# Patient Record
Sex: MALE | Race: BLACK OR AFRICAN AMERICAN | Employment: UNEMPLOYED | URBAN - METROPOLITAN AREA
[De-identification: names, ages, dates, MRNs, and addresses within clinical notes are randomized per-mention and may not be internally consistent; named-entity substitution may affect disease eponyms.]

---

## 2021-01-24 ENCOUNTER — HOSPITAL ENCOUNTER (EMERGENCY)
Age: 36
Discharge: HOME | End: 2021-01-24
Attending: EMERGENCY MEDICINE
Payer: COMMERCIAL

## 2021-01-24 VITALS
WEIGHT: 230 LBS | HEART RATE: 90 BPM | TEMPERATURE: 98.1 F | SYSTOLIC BLOOD PRESSURE: 117 MMHG | BODY MASS INDEX: 32.93 KG/M2 | HEIGHT: 70 IN | RESPIRATION RATE: 17 BRPM | OXYGEN SATURATION: 98 % | DIASTOLIC BLOOD PRESSURE: 79 MMHG

## 2021-01-24 DIAGNOSIS — M25.462 KNEE EFFUSION, LEFT: Primary | ICD-10-CM

## 2021-01-24 PROCEDURE — 99282 EMERGENCY DEPT VISIT SF MDM: CPT

## 2021-01-24 RX ORDER — IBUPROFEN 600 MG/1
600 TABLET ORAL
Qty: 30 TAB | Refills: 0 | Status: SHIPPED | OUTPATIENT
Start: 2021-01-24 | End: 2021-04-26

## 2021-01-24 NOTE — ED TRIAGE NOTES
Pt. Complains of knee swelling for the past day, pt denies any trauma or injury, pt states pain is about 2-3 out of 10. PMS and ROM intact.

## 2021-01-24 NOTE — ED PROVIDER NOTES
HPI patient is a 42-year-old male who presents to the ER with complaint having pain in his left knee. Patient state he injuried  his left knee in the past.  He states every once in a while he have left knee pain with edema. No recent injury. History reviewed. No pertinent past medical history. History reviewed. No pertinent surgical history. History reviewed. No pertinent family history. Social History     Socioeconomic History    Marital status: SINGLE     Spouse name: Not on file    Number of children: Not on file    Years of education: Not on file    Highest education level: Not on file   Occupational History    Not on file   Social Needs    Financial resource strain: Not on file    Food insecurity     Worry: Not on file     Inability: Not on file    Transportation needs     Medical: Not on file     Non-medical: Not on file   Tobacco Use    Smoking status: Current Some Day Smoker    Smokeless tobacco: Never Used   Substance and Sexual Activity    Alcohol use: Yes     Comment: occasionally    Drug use: Never    Sexual activity: Not on file   Lifestyle    Physical activity     Days per week: Not on file     Minutes per session: Not on file    Stress: Not on file   Relationships    Social connections     Talks on phone: Not on file     Gets together: Not on file     Attends Yazidism service: Not on file     Active member of club or organization: Not on file     Attends meetings of clubs or organizations: Not on file     Relationship status: Not on file    Intimate partner violence     Fear of current or ex partner: Not on file     Emotionally abused: Not on file     Physically abused: Not on file     Forced sexual activity: Not on file   Other Topics Concern    Not on file   Social History Narrative    Not on file         ALLERGIES: Keflex [cephalexin]    Review of Systems   Constitutional: Negative. HENT: Negative. Eyes: Negative. Respiratory: Negative. Cardiovascular: Negative. Gastrointestinal: Negative. Endocrine: Negative. Genitourinary: Negative. Musculoskeletal: Negative. LEFT KNEE: (+) mild left knee pain     Skin: Negative. Allergic/Immunologic: Negative. Neurological: Negative. Hematological: Negative. Psychiatric/Behavioral: Negative. All other systems reviewed and are negative. Vitals:    01/24/21 0217   BP: 117/79   Pulse: 90   Resp: 17   Temp: 98.1 °F (36.7 °C)   SpO2: 98%   Weight: 104.3 kg (230 lb)   Height: 5' 10\" (1.778 m)            Physical Exam  Vitals signs and nursing note reviewed. Constitutional:       General: He is not in acute distress. Appearance: He is well-developed. HENT:      Head: Normocephalic. Eyes:      Conjunctiva/sclera: Conjunctivae normal.      Pupils: Pupils are equal, round, and reactive to light. Neck:      Musculoskeletal: Normal range of motion and neck supple. Cardiovascular:      Rate and Rhythm: Normal rate and regular rhythm. Heart sounds: Normal heart sounds. No murmur. Pulmonary:      Effort: Pulmonary effort is normal. No respiratory distress. Breath sounds: Normal breath sounds. No wheezing or rales. Chest:      Chest wall: No tenderness. Abdominal:      General: Bowel sounds are normal. There is no distension. Palpations: Abdomen is soft. Tenderness: There is no abdominal tenderness. There is no rebound. Musculoskeletal: Normal range of motion. General: Swelling present. No tenderness. Comments: LEFT KNEE: (+) minimal edema, no pain with palpation, no palpable effustion, normal ROM, pulses and sensory. Skin:     General: Skin is warm and dry. Findings: No rash. Neurological:      Mental Status: He is alert and oriented to person, place, and time. Cranial Nerves: No cranial nerve deficit. Motor: No abnormal muscle tone.       Coordination: Coordination normal.   Psychiatric:         Behavior: Behavior normal.         Thought Content: Thought content normal.         Judgment: Judgment normal.          MDM       Procedures    Dx: effusion of left knee    Disp: D/C  Home, Rx: motrin    Dictation disclaimer:  Please note that this dictation was completed with [employer], the computer voice recognition software. Quite often unanticipated grammatical, syntax, homophones, and other interpretive errors are inadvertently transcribed by the computer software. Please disregard these errors. Please excuse any errors that have escaped final proofreading.

## 2021-02-01 ENCOUNTER — OFFICE VISIT (OUTPATIENT)
Dept: ORTHOPEDIC SURGERY | Age: 36
End: 2021-02-01
Payer: COMMERCIAL

## 2021-02-01 VITALS
WEIGHT: 229 LBS | HEART RATE: 68 BPM | BODY MASS INDEX: 32.78 KG/M2 | SYSTOLIC BLOOD PRESSURE: 118 MMHG | RESPIRATION RATE: 16 BRPM | TEMPERATURE: 96.3 F | OXYGEN SATURATION: 97 % | DIASTOLIC BLOOD PRESSURE: 80 MMHG | HEIGHT: 70 IN

## 2021-02-01 DIAGNOSIS — M25.562 ACUTE PAIN OF LEFT KNEE: ICD-10-CM

## 2021-02-01 DIAGNOSIS — D21.9 OSTEOCHONDROMATOSIS, SYNOVIAL: Primary | ICD-10-CM

## 2021-02-01 DIAGNOSIS — M17.12 PRIMARY OSTEOARTHRITIS OF LEFT KNEE: ICD-10-CM

## 2021-02-01 PROCEDURE — 73560 X-RAY EXAM OF KNEE 1 OR 2: CPT | Performed by: ORTHOPAEDIC SURGERY

## 2021-02-01 PROCEDURE — 99203 OFFICE O/P NEW LOW 30 MIN: CPT | Performed by: ORTHOPAEDIC SURGERY

## 2021-02-01 RX ORDER — MELOXICAM 15 MG/1
15 TABLET ORAL
Qty: 30 TAB | Refills: 1 | Status: SHIPPED | OUTPATIENT
Start: 2021-02-01 | End: 2021-04-26
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
## 2021-02-02 DIAGNOSIS — D21.9 OSTEOCHONDROMATOSIS, SYNOVIAL: ICD-10-CM

## 2021-02-02 DIAGNOSIS — M17.12 PRIMARY OSTEOARTHRITIS OF LEFT KNEE: ICD-10-CM

## 2021-04-10 ENCOUNTER — HOSPITAL ENCOUNTER (OUTPATIENT)
Age: 36
Discharge: HOME | End: 2021-04-10
Attending: ORTHOPAEDIC SURGERY
Payer: COMMERCIAL

## 2021-04-10 PROCEDURE — 73721 MRI JNT OF LWR EXTRE W/O DYE: CPT

## 2021-04-26 ENCOUNTER — HOSPITAL ENCOUNTER (EMERGENCY)
Age: 36
Discharge: HOME | End: 2021-04-26
Attending: EMERGENCY MEDICINE
Payer: COMMERCIAL

## 2021-04-26 VITALS
WEIGHT: 230 LBS | TEMPERATURE: 98.1 F | OXYGEN SATURATION: 99 % | HEART RATE: 69 BPM | BODY MASS INDEX: 32.93 KG/M2 | HEIGHT: 70 IN | RESPIRATION RATE: 16 BRPM | SYSTOLIC BLOOD PRESSURE: 124 MMHG | DIASTOLIC BLOOD PRESSURE: 84 MMHG

## 2021-04-26 DIAGNOSIS — K08.89 PAIN, DENTAL: Primary | ICD-10-CM

## 2021-04-26 PROCEDURE — 99282 EMERGENCY DEPT VISIT SF MDM: CPT

## 2021-04-26 RX ORDER — LIDOCAINE HYDROCHLORIDE 20 MG/ML
SOLUTION OROPHARYNGEAL
Qty: 200 ML | Refills: 0 | Status: SHIPPED | OUTPATIENT
Start: 2021-04-26

## 2021-04-26 RX ORDER — CLINDAMYCIN HYDROCHLORIDE 300 MG/1
300 CAPSULE ORAL 3 TIMES DAILY
Qty: 21 CAP | Refills: 0 | Status: SHIPPED | OUTPATIENT
Start: 2021-04-26 | End: 2021-05-03

## 2021-04-26 RX ORDER — NAPROXEN 500 MG/1
500 TABLET ORAL 2 TIMES DAILY WITH MEALS
Qty: 20 TAB | Refills: 0 | Status: SHIPPED | OUTPATIENT
Start: 2021-04-26 | End: 2021-05-06
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
## 2021-04-26 NOTE — DISCHARGE INSTRUCTIONS
Follow-up with one of the provided dental clinics below:    [facility] ([address] [place] [id number] [phone]  [facility] [address] [phone]    Call to schedule an appointment.

## 2021-04-26 NOTE — ED TRIAGE NOTES
Patient c/o possible wisdom tooth eruption to left lower gumline. He states pain began on Saturday. He states that he is in process of scheduling dental appointment.